# Patient Record
Sex: MALE | URBAN - METROPOLITAN AREA
[De-identification: names, ages, dates, MRNs, and addresses within clinical notes are randomized per-mention and may not be internally consistent; named-entity substitution may affect disease eponyms.]

---

## 2022-04-20 ENCOUNTER — APPOINTMENT (RX ONLY)
Dept: URBAN - METROPOLITAN AREA CLINIC 61 | Facility: CLINIC | Age: 82
Setting detail: DERMATOLOGY
End: 2022-04-20

## 2022-04-20 DIAGNOSIS — D69.2 OTHER NONTHROMBOCYTOPENIC PURPURA: ICD-10-CM

## 2022-04-20 DIAGNOSIS — D49.2 NEOPLASM OF UNSPECIFIED BEHAVIOR OF BONE, SOFT TISSUE, AND SKIN: ICD-10-CM | Status: WORSENING

## 2022-04-20 DIAGNOSIS — D22 MELANOCYTIC NEVI: ICD-10-CM

## 2022-04-20 PROBLEM — D22.5 MELANOCYTIC NEVI OF TRUNK: Status: ACTIVE | Noted: 2022-04-20

## 2022-04-20 PROCEDURE — ? FULL BODY SKIN EXAM - DECLINED

## 2022-04-20 PROCEDURE — 11102 TANGNTL BX SKIN SINGLE LES: CPT

## 2022-04-20 PROCEDURE — 99202 OFFICE O/P NEW SF 15 MIN: CPT | Mod: 25

## 2022-04-20 PROCEDURE — ? BIOPSY BY SHAVE METHOD

## 2022-04-20 PROCEDURE — ? COUNSELING

## 2022-04-20 PROCEDURE — ? ADDITIONAL NOTES

## 2022-04-20 ASSESSMENT — LOCATION SIMPLE DESCRIPTION DERM
LOCATION SIMPLE: LEFT FOREARM
LOCATION SIMPLE: RIGHT FOREARM
LOCATION SIMPLE: RIGHT UPPER BACK
LOCATION SIMPLE: LEFT HAND
LOCATION SIMPLE: CHEST

## 2022-04-20 ASSESSMENT — LOCATION DETAILED DESCRIPTION DERM
LOCATION DETAILED: RIGHT DISTAL DORSAL FOREARM
LOCATION DETAILED: LEFT MEDIAL SUPERIOR CHEST
LOCATION DETAILED: RIGHT SUPERIOR UPPER BACK
LOCATION DETAILED: LEFT ULNAR DORSAL HAND
LOCATION DETAILED: LEFT PROXIMAL DORSAL FOREARM

## 2022-04-20 ASSESSMENT — LOCATION ZONE DERM
LOCATION ZONE: HAND
LOCATION ZONE: ARM
LOCATION ZONE: TRUNK

## 2022-04-20 NOTE — PROCEDURE: BIOPSY BY SHAVE METHOD
Detail Level: Detailed
Depth Of Biopsy: dermis
Was A Bandage Applied: Yes
Size Of Lesion In Cm: 1
X Size Of Lesion In Cm: 0
Biopsy Type: H and E
Biopsy Method: Personna blade
Anesthesia Type: 1% lidocaine with epinephrine
Anesthesia Volume In Cc (Will Not Render If 0): 0.5
Hemostasis: Aluminum Chloride
Wound Care: Petrolatum
Dressing: pressure dressing
Destruction After The Procedure: No
Type Of Destruction Used: Curettage
Curettage Text: The wound bed was treated with curettage after the biopsy was performed.
Cryotherapy Text: The wound bed was treated with cryotherapy after the biopsy was performed.
Electrodesiccation Text: The wound bed was treated with electrodesiccation after the biopsy was performed.
Electrodesiccation And Curettage Text: The wound bed was treated with electrodesiccation and curettage after the biopsy was performed.
Silver Nitrate Text: The wound bed was treated with silver nitrate after the biopsy was performed.
Lab: 6
Lab Facility: [id number]
Consent: Written consent was obtained and risks were reviewed including but not limited to scarring, infection, bleeding, scabbing, incomplete removal, nerve damage and allergy to anesthesia.
Post-Care Instructions: I reviewed with the patient in detail post-care instructions. Patient is to keep the biopsy site dry overnight, and then apply vaseline twice daily until healed.
Notification Instructions: Patient will be notified of biopsy results. However, patient instructed to call the office if not contacted within 2 weeks.
Billing Type: Third-Party Bill
Information: Selecting Yes will display possible errors in your note based on the variables you have selected. This validation is only offered as a suggestion for you. PLEASE NOTE THAT THE VALIDATION TEXT WILL BE REMOVED WHEN YOU FINALIZE YOUR NOTE. IF YOU WANT TO FAX A PRELIMINARY NOTE YOU WILL NEED TO TOGGLE THIS TO 'NO' IF YOU DO NOT WANT IT IN YOUR FAXED NOTE.

## 2022-05-02 ENCOUNTER — APPOINTMENT (RX ONLY)
Dept: URBAN - METROPOLITAN AREA CLINIC 61 | Facility: CLINIC | Age: 82
Setting detail: DERMATOLOGY
End: 2022-05-02

## 2022-05-02 VITALS — HEART RATE: 61 BPM | DIASTOLIC BLOOD PRESSURE: 63 MMHG | SYSTOLIC BLOOD PRESSURE: 121 MMHG

## 2022-05-02 VITALS — SYSTOLIC BLOOD PRESSURE: 121 MMHG | HEART RATE: 61 BPM | DIASTOLIC BLOOD PRESSURE: 63 MMHG

## 2022-05-02 PROBLEM — C44.629 SQUAMOUS CELL CARCINOMA OF SKIN OF LEFT UPPER LIMB, INCLUDING SHOULDER: Status: ACTIVE | Noted: 2022-05-02

## 2022-05-02 PROCEDURE — ? ADDITIONAL NOTES

## 2022-05-02 PROCEDURE — ? MOHS SURGERY

## 2022-05-02 PROCEDURE — ? REPAIR NOTE

## 2022-05-02 PROCEDURE — 17311 MOHS 1 STAGE H/N/HF/G: CPT

## 2022-05-02 PROCEDURE — ? CONSULTATION FOR SURGICAL REPAIR

## 2022-05-02 PROCEDURE — 13132 CMPLX RPR F/C/C/M/N/AX/G/H/F: CPT

## 2022-05-02 NOTE — PROCEDURE: CONSULTATION FOR SURGICAL REPAIR
Size Of Defect: 1.5
Detail Level: Detailed
X Size Of Defect In Cm (Optional): 1.3
Referring Provider (Optional): Dr. Esquivel

## 2022-05-02 NOTE — PROCEDURE: MOHS SURGERY
Mohs Method Verbiage: An incision at a 90 degree angle following the standard Mohs approach was done and the specimen was harvested as a microscopic controlled layer. All histological findings, and if present, perineural invasion or presence of scar is noted on the Mohs Map.

## 2022-05-02 NOTE — PROCEDURE: MOHS SURGERY
Spoke to Dr Charles office, they will fax over new referral for Endocrinology. Also requesting any pertinent labs and Dexa scan to be faxed as well   Advancement Flap (Double) Text: Given the location of the defect and the proximity to free margins a double advancement flap was deemed most appropriate.  Using a sterile surgical marker, the appropriate advancement flaps were drawn incorporating the defect and placing the expected incisions within the relaxed skin tension lines where possible.    The area thus outlined was incised deep to adipose tissue with a #15c scalpel blade.  The skin margins were undermined to an appropriate distance in all directions utilizing iris scissors.

## 2022-05-09 ENCOUNTER — APPOINTMENT (RX ONLY)
Dept: URBAN - METROPOLITAN AREA CLINIC 61 | Facility: CLINIC | Age: 82
Setting detail: DERMATOLOGY
End: 2022-05-09

## 2022-05-09 DIAGNOSIS — Z48.817 ENCOUNTER FOR SURGICAL AFTERCARE FOLLOWING SURGERY ON THE SKIN AND SUBCUTANEOUS TISSUE: ICD-10-CM

## 2022-05-09 PROCEDURE — ? ADDITIONAL NOTES

## 2022-05-09 PROCEDURE — ? POST-OP WOUND CHECK

## 2022-05-09 PROCEDURE — ? TREATMENT REGIMEN

## 2022-05-09 PROCEDURE — 99024 POSTOP FOLLOW-UP VISIT: CPT

## 2022-05-09 ASSESSMENT — LOCATION DETAILED DESCRIPTION DERM: LOCATION DETAILED: LEFT ULNAR DORSAL HAND

## 2022-05-09 ASSESSMENT — LOCATION SIMPLE DESCRIPTION DERM: LOCATION SIMPLE: LEFT HAND

## 2022-05-09 ASSESSMENT — LOCATION ZONE DERM: LOCATION ZONE: HAND

## 2022-05-09 NOTE — PROCEDURE: POST-OP WOUND CHECK
Add 78949 Cpt? (Important Note: In 2017 The Use Of 14254 Is Being Tracked By Cms To Determine Future Global Period Reimbursement For Global Periods): yes
Detail Level: Detailed

## 2022-05-11 ENCOUNTER — APPOINTMENT (RX ONLY)
Dept: URBAN - METROPOLITAN AREA CLINIC 61 | Facility: CLINIC | Age: 82
Setting detail: DERMATOLOGY
End: 2022-05-11

## 2022-05-11 DIAGNOSIS — D22 MELANOCYTIC NEVI: ICD-10-CM

## 2022-05-11 DIAGNOSIS — Z85.828 PERSONAL HISTORY OF OTHER MALIGNANT NEOPLASM OF SKIN: ICD-10-CM | Status: RESOLVING

## 2022-05-11 DIAGNOSIS — L82.1 OTHER SEBORRHEIC KERATOSIS: ICD-10-CM

## 2022-05-11 DIAGNOSIS — L81.4 OTHER MELANIN HYPERPIGMENTATION: ICD-10-CM

## 2022-05-11 DIAGNOSIS — L57.0 ACTINIC KERATOSIS: ICD-10-CM

## 2022-05-11 DIAGNOSIS — Z71.89 OTHER SPECIFIED COUNSELING: ICD-10-CM

## 2022-05-11 PROBLEM — D22.5 MELANOCYTIC NEVI OF TRUNK: Status: ACTIVE | Noted: 2022-05-11

## 2022-05-11 PROCEDURE — ? ADDITIONAL NOTES

## 2022-05-11 PROCEDURE — ? SUNSCREEN TREATMENT REGIMEN

## 2022-05-11 PROCEDURE — ? LIQUID NITROGEN

## 2022-05-11 PROCEDURE — 99213 OFFICE O/P EST LOW 20 MIN: CPT | Mod: 24,25

## 2022-05-11 PROCEDURE — 17000 DESTRUCT PREMALG LESION: CPT | Mod: 79

## 2022-05-11 PROCEDURE — ? COUNSELING

## 2022-05-11 PROCEDURE — ? TREATMENT REGIMEN

## 2022-05-11 ASSESSMENT — LOCATION DETAILED DESCRIPTION DERM
LOCATION DETAILED: SUPERIOR THORACIC SPINE
LOCATION DETAILED: RIGHT PROXIMAL RADIAL DORSAL FOREARM
LOCATION DETAILED: LEFT PROXIMAL DORSAL FOREARM
LOCATION DETAILED: RIGHT SUPERIOR CRUS OF ANTIHELIX
LOCATION DETAILED: INFERIOR THORACIC SPINE
LOCATION DETAILED: LEFT ULNAR DORSAL HAND

## 2022-05-11 ASSESSMENT — LOCATION SIMPLE DESCRIPTION DERM
LOCATION SIMPLE: RIGHT EAR
LOCATION SIMPLE: UPPER BACK
LOCATION SIMPLE: RIGHT FOREARM
LOCATION SIMPLE: LEFT HAND
LOCATION SIMPLE: LEFT FOREARM

## 2022-05-11 ASSESSMENT — LOCATION ZONE DERM
LOCATION ZONE: HAND
LOCATION ZONE: ARM
LOCATION ZONE: EAR
LOCATION ZONE: TRUNK

## 2022-05-11 NOTE — PROCEDURE: LIQUID NITROGEN
Consent: The patient's verbal consent was obtained including but not limited to risks of crusting, scabbing, blistering, scarring, darker or lighter pigmentary change, recurrence, incomplete removal and infection.
Show Aperture Variable?: Yes
Render Post-Care Instructions In Note?: no
Detail Level: Detailed
Duration Of Freeze Thaw-Cycle (Seconds): 0
Post-Care Instructions: I reviewed with the patient in detail post-care instructions. Patient is to wear sunprotection, and avoid picking at any of the treated lesions. Pt may apply Vaseline to crusted or scabbing areas.

## 2022-05-23 ENCOUNTER — APPOINTMENT (RX ONLY)
Dept: URBAN - METROPOLITAN AREA CLINIC 61 | Facility: CLINIC | Age: 82
Setting detail: DERMATOLOGY
End: 2022-05-23

## 2022-05-23 DIAGNOSIS — Z48.817 ENCOUNTER FOR SURGICAL AFTERCARE FOLLOWING SURGERY ON THE SKIN AND SUBCUTANEOUS TISSUE: ICD-10-CM

## 2022-05-23 PROCEDURE — 99024 POSTOP FOLLOW-UP VISIT: CPT

## 2022-05-23 PROCEDURE — ? POST-OP WOUND CHECK

## 2022-05-23 PROCEDURE — ? TREATMENT REGIMEN

## 2022-05-23 ASSESSMENT — LOCATION DETAILED DESCRIPTION DERM: LOCATION DETAILED: LEFT ULNAR DORSAL HAND

## 2022-05-23 ASSESSMENT — LOCATION SIMPLE DESCRIPTION DERM: LOCATION SIMPLE: LEFT HAND

## 2022-05-23 ASSESSMENT — LOCATION ZONE DERM: LOCATION ZONE: HAND

## 2022-05-23 NOTE — PROCEDURE: POST-OP WOUND CHECK
Detail Level: Detailed
Add 07002 Cpt? (Important Note: In 2017 The Use Of 26668 Is Being Tracked By Cms To Determine Future Global Period Reimbursement For Global Periods): yes
Wound Evaluated By: RODRIGO Lassiter CMA
Additional Comments: Complex repair on the Left ulnar dorsal hand by Dr. Herzog.

## 2022-11-16 ENCOUNTER — APPOINTMENT (RX ONLY)
Dept: URBAN - METROPOLITAN AREA CLINIC 61 | Facility: CLINIC | Age: 82
Setting detail: DERMATOLOGY
End: 2022-11-16

## 2022-11-16 DIAGNOSIS — Z85.828 PERSONAL HISTORY OF OTHER MALIGNANT NEOPLASM OF SKIN: ICD-10-CM | Status: RESOLVED

## 2022-11-16 DIAGNOSIS — L81.4 OTHER MELANIN HYPERPIGMENTATION: ICD-10-CM

## 2022-11-16 DIAGNOSIS — D22 MELANOCYTIC NEVI: ICD-10-CM

## 2022-11-16 DIAGNOSIS — L82.1 OTHER SEBORRHEIC KERATOSIS: ICD-10-CM

## 2022-11-16 PROBLEM — D22.9 MELANOCYTIC NEVI, UNSPECIFIED: Status: ACTIVE | Noted: 2022-11-16

## 2022-11-16 PROCEDURE — ? SUNSCREEN TREATMENT REGIMEN

## 2022-11-16 PROCEDURE — 99213 OFFICE O/P EST LOW 20 MIN: CPT

## 2022-11-16 PROCEDURE — ? COUNSELING

## 2022-11-16 PROCEDURE — ? FULL BODY SKIN EXAM - DECLINED

## 2023-09-20 ENCOUNTER — APPOINTMENT (RX ONLY)
Dept: URBAN - METROPOLITAN AREA CLINIC 61 | Facility: CLINIC | Age: 83
Setting detail: DERMATOLOGY
End: 2023-09-20

## 2023-09-20 DIAGNOSIS — L82.1 OTHER SEBORRHEIC KERATOSIS: ICD-10-CM

## 2023-09-20 DIAGNOSIS — L81.4 OTHER MELANIN HYPERPIGMENTATION: ICD-10-CM

## 2023-09-20 DIAGNOSIS — L0390 CELLULITIS AND ABSCESS OF UNSPECIFIED SITES: ICD-10-CM

## 2023-09-20 DIAGNOSIS — L0391 CELLULITIS AND ABSCESS OF UNSPECIFIED SITES: ICD-10-CM

## 2023-09-20 DIAGNOSIS — L57.0 ACTINIC KERATOSIS: ICD-10-CM

## 2023-09-20 DIAGNOSIS — D22 MELANOCYTIC NEVI: ICD-10-CM

## 2023-09-20 PROBLEM — L02.212 CUTANEOUS ABSCESS OF BACK [ANY PART, EXCEPT BUTTOCK]: Status: ACTIVE | Noted: 2023-09-20

## 2023-09-20 PROBLEM — D22.5 MELANOCYTIC NEVI OF TRUNK: Status: ACTIVE | Noted: 2023-09-20

## 2023-09-20 PROCEDURE — ? LIQUID NITROGEN

## 2023-09-20 PROCEDURE — ? INCISION AND DRAINAGE

## 2023-09-20 PROCEDURE — 99213 OFFICE O/P EST LOW 20 MIN: CPT | Mod: 25

## 2023-09-20 PROCEDURE — ? COUNSELING

## 2023-09-20 PROCEDURE — 10060 I&D ABSCESS SIMPLE/SINGLE: CPT

## 2023-09-20 PROCEDURE — 17000 DESTRUCT PREMALG LESION: CPT

## 2023-09-20 PROCEDURE — ? FULL BODY SKIN EXAM - DECLINED

## 2023-09-20 PROCEDURE — 17003 DESTRUCT PREMALG LES 2-14: CPT

## 2023-09-20 ASSESSMENT — LOCATION ZONE DERM
LOCATION ZONE: SCALP
LOCATION ZONE: LIP
LOCATION ZONE: TRUNK

## 2023-09-20 ASSESSMENT — LOCATION DETAILED DESCRIPTION DERM
LOCATION DETAILED: LEFT MID-UPPER BACK
LOCATION DETAILED: LEFT SUPERIOR POSTAURICULAR SKIN
LOCATION DETAILED: LEFT SUPERIOR UPPER BACK
LOCATION DETAILED: LEFT UPPER CUTANEOUS LIP
LOCATION DETAILED: RIGHT SUPERIOR LATERAL MIDBACK
LOCATION DETAILED: RIGHT MID-UPPER BACK
LOCATION DETAILED: LEFT LATERAL ABDOMEN

## 2023-09-20 ASSESSMENT — LOCATION SIMPLE DESCRIPTION DERM
LOCATION SIMPLE: RIGHT UPPER BACK
LOCATION SIMPLE: SCALP
LOCATION SIMPLE: RIGHT LOWER BACK
LOCATION SIMPLE: ABDOMEN
LOCATION SIMPLE: LEFT LIP
LOCATION SIMPLE: LEFT UPPER BACK

## 2023-10-14 NOTE — PROCEDURE: MOHS SURGERY
PT evaluate and treat Home Suture Removal Text: Patient was provided instructions on removing sutures and will remove their sutures at home.  If they have any questions or difficulties they will call the office.

## 2024-05-03 NOTE — PROCEDURE: MOHS SURGERY
Bloqueador solar SPF 30 (broad spectrum) 15-30 minutos antes de salir afuera, puede poner cada 2 horas  Ropa y bong para proteccion del sol  Puede usar repelente de insectos con DEET  Debe bañarse antes de dormirse para quitar el repelente        Tylenol/Acetaminophen Dosing    Please dose every 4 hours as needed, do not give more than 5 doses in any 24 hour period  Children's Oral Suspension= 160 mg/5ml  Childrens Chewable =80 mg  Jr Strength Chewables= 160 mg                                                              Tylenol suspension   Childrens Chewable   Jr. Strength Chewable                                                                                                                                                                           12-17 lbs               2.5 ml  18-23 lbs               3.75 ml  24-35 lbs               5 ml                          2                              1      Ibuprofen/Advil/Motrin Dosing    Ibuprofen is dosed every 6-8 hours as needed  Never give more than 4 doses in a 24 hour period  Please note the difference in the strengths between infant and children's ibuprofen  Do not give ibuprofen to children under 6 months of age unless advised by your doctor    Infant Concentrated drops = 50 mg/1.25ml  Children's suspension =100 mg/5 ml  Children's chewable = 100mg                                   Infant concentrated      Childrens               Chewables                                            Drops                      Suspension                12-17 lbs                1.25 ml  18-23 lbs                1.875 ml      3.75 ml  24-35 lbs                2.5 ml                            5 ml                            1    Anesthesia Volume In Cc: 6

## 2024-09-18 ENCOUNTER — APPOINTMENT (RX ONLY)
Dept: URBAN - METROPOLITAN AREA CLINIC 61 | Facility: CLINIC | Age: 84
Setting detail: DERMATOLOGY
End: 2024-09-18

## 2024-09-18 DIAGNOSIS — Z85.828 PERSONAL HISTORY OF OTHER MALIGNANT NEOPLASM OF SKIN: ICD-10-CM

## 2024-09-18 DIAGNOSIS — D18.0 HEMANGIOMA: ICD-10-CM | Status: STABLE

## 2024-09-18 DIAGNOSIS — D22 MELANOCYTIC NEVI: ICD-10-CM

## 2024-09-18 DIAGNOSIS — B07.8 OTHER VIRAL WARTS: ICD-10-CM | Status: INADEQUATELY CONTROLLED

## 2024-09-18 DIAGNOSIS — L82.1 OTHER SEBORRHEIC KERATOSIS: ICD-10-CM

## 2024-09-18 DIAGNOSIS — L81.4 OTHER MELANIN HYPERPIGMENTATION: ICD-10-CM

## 2024-09-18 DIAGNOSIS — L57.0 ACTINIC KERATOSIS: ICD-10-CM | Status: INADEQUATELY CONTROLLED

## 2024-09-18 PROBLEM — D18.01 HEMANGIOMA OF SKIN AND SUBCUTANEOUS TISSUE: Status: ACTIVE | Noted: 2024-09-18

## 2024-09-18 PROBLEM — D48.5 NEOPLASM OF UNCERTAIN BEHAVIOR OF SKIN: Status: ACTIVE | Noted: 2024-09-18

## 2024-09-18 PROBLEM — D22.5 MELANOCYTIC NEVI OF TRUNK: Status: ACTIVE | Noted: 2024-09-18

## 2024-09-18 PROCEDURE — ? TREATMENT REGIMEN

## 2024-09-18 PROCEDURE — ? PHOTO-DOCUMENTATION

## 2024-09-18 PROCEDURE — ? BIOPSY BY SHAVE METHOD

## 2024-09-18 PROCEDURE — 99213 OFFICE O/P EST LOW 20 MIN: CPT | Mod: 25

## 2024-09-18 PROCEDURE — 17110 DESTRUCTION B9 LES UP TO 14: CPT

## 2024-09-18 PROCEDURE — 11102 TANGNTL BX SKIN SINGLE LES: CPT | Mod: 59

## 2024-09-18 PROCEDURE — ? BENIGN DESTRUCTION

## 2024-09-18 PROCEDURE — ? LIQUID NITROGEN

## 2024-09-18 PROCEDURE — ? COUNSELING

## 2024-09-18 PROCEDURE — 17000 DESTRUCT PREMALG LESION: CPT | Mod: 59

## 2024-09-18 PROCEDURE — ? DIAGNOSIS COMMENT

## 2024-09-18 ASSESSMENT — LOCATION DETAILED DESCRIPTION DERM
LOCATION DETAILED: RIGHT POSTERIOR SHOULDER
LOCATION DETAILED: EPIGASTRIC SKIN
LOCATION DETAILED: LEFT MEDIAL UPPER BACK
LOCATION DETAILED: LEFT POSTERIOR SHOULDER
LOCATION DETAILED: RIGHT ANTIHELIX
LOCATION DETAILED: LEFT INFERIOR UPPER BACK
LOCATION DETAILED: RIGHT ANTERIOR PROXIMAL THIGH
LOCATION DETAILED: LEFT MID-UPPER BACK
LOCATION DETAILED: RIGHT MID-UPPER BACK

## 2024-09-18 ASSESSMENT — LOCATION SIMPLE DESCRIPTION DERM
LOCATION SIMPLE: RIGHT EAR
LOCATION SIMPLE: RIGHT THIGH
LOCATION SIMPLE: ABDOMEN
LOCATION SIMPLE: LEFT SHOULDER
LOCATION SIMPLE: RIGHT SHOULDER
LOCATION SIMPLE: LEFT UPPER BACK
LOCATION SIMPLE: RIGHT UPPER BACK

## 2024-09-18 ASSESSMENT — LOCATION ZONE DERM
LOCATION ZONE: ARM
LOCATION ZONE: TRUNK
LOCATION ZONE: LEG
LOCATION ZONE: EAR

## 2024-09-18 NOTE — PROCEDURE: BIOPSY BY SHAVE METHOD
Detail Level: Detailed
Depth Of Biopsy: dermis
Was A Bandage Applied: Yes
Size Of Lesion In Cm: 0.5
X Size Of Lesion In Cm: 0
Anticipated Plan (Based On Presumed Biopsy Results): Mohs
Biopsy Type: H and E
Biopsy Method: double edge Personna blade
Anesthesia Type: 1% lidocaine with epinephrine
Hemostasis: Electrocautery and Aluminum Chloride
Wound Care: Petrolatum
Dressing: bandage
Destruction After The Procedure: No
Type Of Destruction Used: Curettage
Curettage Text: The wound bed was treated with curettage after the biopsy was performed.
Cryotherapy Text: The wound bed was treated with cryotherapy after the biopsy was performed.
Electrodesiccation Text: The wound bed was treated with electrodesiccation after the biopsy was performed.
Electrodesiccation And Curettage Text: The wound bed was treated with electrodesiccation and curettage after the biopsy was performed.
Silver Nitrate Text: The wound bed was treated with silver nitrate after the biopsy was performed.
Consent: Written consent was obtained and risks were reviewed including but not limited to scarring, infection, bleeding, scabbing, incomplete removal, nerve damage and allergy to anesthesia.
Post-Care Instructions: I reviewed with the patient in detail post-care instructions. Patient is to keep the biopsy site dry overnight, and then apply bacitracin twice daily until healed. Patient may apply hydrogen peroxide soaks to remove any crusting.
Notification Instructions: Patient will be notified of biopsy results. However, patient instructed to call the office if not contacted within 2 weeks.
Billing Type: Third-Party Bill
Information: Selecting Yes will display possible errors in your note based on the variables you have selected. This validation is only offered as a suggestion for you. PLEASE NOTE THAT THE VALIDATION TEXT WILL BE REMOVED WHEN YOU FINALIZE YOUR NOTE. IF YOU WANT TO FAX A PRELIMINARY NOTE YOU WILL NEED TO TOGGLE THIS TO 'NO' IF YOU DO NOT WANT IT IN YOUR FAXED NOTE.

## 2024-09-18 NOTE — PROCEDURE: BENIGN DESTRUCTION
Treatment Number (Will Not Render If 0): 0
Add 52 Modifier (Optional): no
Detail Level: Detailed
Post-Care Instructions: I reviewed with the patient in detail post-care instructions. Patient is to wear sunprotection, and avoid picking at any of the treated lesions. Pt may apply Vaseline to crusted or scabbing areas.
Consent: The patient's consent was obtained including but not limited to risks of crusting, scabbing, blistering, scarring, darker or lighter pigmentary change, recurrence, incomplete removal and infection.
Medical Necessity Information: It is in your best interest to select a reason for this procedure from the list below. All of these items fulfill various CMS LCD requirements except the new and changing color options.
Medical Necessity Clause: This procedure was medically necessary because the lesions that were treated were:

## 2025-05-06 NOTE — PROCEDURE: REPAIR NOTE
Preston Wallis presents to [facility].    Chief Complaint: Recent hospitalization    Subjective     History of Present Illness:  HPI  I am seeing Gómez today for recent hospitalization at [facility].  He is admitted on 4/22/2025 and discharged on 4/24/2025.  He was admitted due to nausea and vomiting with an elevated white count indicative of infection.  Workup was performed and given his GI symptoms it was suspected he had an intra-abdominal source of infection.  He was given IV vancomycin and Zosyn and is leukocytes improved.  Blood cultures were negative at discharge.  He was discharged home on ciprofloxacin and Flagyl for 7 more days.  While in the hospital he also had acute on chronic CHF exacerbation.  He was treated with Lasix 40 mg IV twice daily and was -6 L at the time of discharge.  He also presented with a wound to the left heel and coccyx.  He is under treatment with wound care Ermelinda Fitzgerald.  Wounds are being treated with Santyl and DAC and solution and the sacral wound is being managed with daily cleaning and application of Aquacel, which is then covered.  Home health is coming to see him.  He is nonambulatory and needs full assistance with standing and transfers.      Gómez has type 2 diabetes mellitus with diabetic peripheral angiopathy without gangrene, neurogenic bladder, and peripheral neuropathy in his on long-term current use of insulin Basaglar 15 units daily.  He is also on Farxiga 5 mg daily and Ozempic 1 mg weekly and he is tolerating medication well    He has chronic hypertension  he is on diltiazem 240 mg xr daily    He presents with benign prostate hypertrophy and self caths.  He is currently on Proscar and tamsulosin and sees urology.    He has chronic paroxysmal atrial fibrillation and he sees cardiology.  Current treatment includes Eliquis 5 mg twice daily and diltiazem 240 mg XR daily    He has chronic COPD and continues pulmonary toilet with Brovana  nebulizer, DuoNebs, budesonide nebs, MOIZ nebulizer, his chronic allergies are stable on Singulair    He has chronic hypercholesterolemia, current treatment is atorvastatin 20 mg daily and he tolerates med well.  He is also to follow a low cholesterol daily    He presents with chronic GERD which is stable on pantoprazole.     In addition he is on magnesium supplementation, vitamin D3, Rosalino, vitamin C, calcium, folic acid and zinc and iron    He has chronic anxiety and is currently stable on cymbalta and BuSpar 15 mg twice daily    He is on RADHA stool softeners for chronic constipation            Result Review   The following data was reviewed by Kimmy Riley MD on 05/06/2025.  Lab Results   Component Value Date    WBC 14.15 (H) 04/24/2025    HGB 8.4 (L) 04/24/2025    HCT 27.2 (L) 04/24/2025    MCV 89.5 04/24/2025     04/24/2025     Lab Results   Component Value Date    GLUCOSE 125 (H) 04/24/2025    BUN 22 (H) 04/24/2025    CREATININE 2.03 (H) 04/24/2025     04/24/2025    K 3.4 (L) 04/24/2025    CL 95 (L) 04/24/2025    CALCIUM 8.5 (L) 04/24/2025    PROTEINTOT 6.9 04/24/2025    ALBUMIN 2.5 (L) 04/24/2025    ALT 39 04/24/2025    AST 51 (H) 04/24/2025    ALKPHOS 224 (H) 04/24/2025    BILITOT 0.3 04/24/2025    GLOB 4.4 04/24/2025    AGRATIO 0.6 04/24/2025    BCR 10.8 04/24/2025    ANIONGAP 12.3 04/24/2025    EGFR 37.1 (L) 04/24/2025     Lab Results   Component Value Date    CHOL 116 01/25/2025    CHLPL 165 08/26/2020    TRIG 49 01/25/2025    HDL 54 01/25/2025    LDL 50 01/25/2025     Lab Results   Component Value Date    TSH 1.497 04/13/2024     Lab Results   Component Value Date    HGBA1C 7.0 (A) 03/14/2025     Lab Results   Component Value Date    PSA 0.206 07/18/2024    PSA 0.203 05/25/2023    PSA 0.725 03/17/2022     Lab Results   Component Value Date    Iron 31 (L) 01/31/2025    Iron Saturation 13 (L) 03/17/2020    Iron Saturation (TSAT) 17 (L) 01/31/2025      Lab Results   Component Value Date     ABYS50AT 41.4 07/18/2024               Assessment and Plan:   Assessment & Plan  Urinary tract infection without hematuria, site unspecified  Urine was positive for red blood cells but no signs of bacteria or white blood cells.  It is being sent for culture.  He is on Eliquis which puts him at higher risk for blood in urine  Orders:    Urinalysis With Culture If Indicated - Urine, Catheter    Urinalysis, Microscopic Only - Urine, Catheter    Leukocytosis, unspecified type  Will recheck a white blood cell count.  Recently treated for an internal abdominal infection with antibiotics.  He will complete antibiotics and check labs today       Type 2 diabetes mellitus with diabetic peripheral angiopathy without gangrene, with long-term current use of insulin  Overall stable and well-controlled on current treatment plan.  Continue current medications.  He needs his eye exam once a year.  Currently getting wound care for diabetic foot ulcer         Paroxysmal atrial fibrillation  He is in normal sinus rhythm today.  He is to follow-up with cardiology as directed and continue Cardizem and Eliquis as directed  Orders:    aspirin (Aspirin Low Dose) 81 MG EC tablet; Take 1 tablet by mouth Every Morning.    apixaban (Eliquis) 5 MG tablet tablet; Take 1 tablet by mouth Every 12 (Twelve) Hours.    dilTIAZem CD (CARDIZEM CD) 240 MG 24 hr capsule; Take 1 capsule by mouth Every Morning.    Obstructive sleep apnea  He needs to be on a CPAP machine and he did not tolerate can this is been removed from his home.  He understands the risk of not treating sleep apnea.  He is working on weight loss and Ozempic is helping.  He is unable to exercise.  Encourage healthy diet       Chronic obstructive pulmonary disease, unspecified COPD type   stable on current treatment plan.  No changes to current nebulizers         Benign prostatic hyperplasia with lower urinary tract symptoms, symptom details unspecified  Stable on tamsulosin and  Proscar.  No changes in current treatment plan.  Permanent catheter in place       Stage 3a chronic kidney disease  He is to avoid NSAIDs and push fluids 64 ounces a day    Orders:    Comprehensive metabolic panel; Future    Iron deficiency anemia secondary to inadequate dietary iron intake  Currently on iron supplementation.  Will check labs to see if his iron levels have improved  Orders:    CBC w AUTO Differential; Future    Iron Profile; Future    Vitamin D deficiency  Currently on vitamin D supplementation.  Will check labs to see if his vitamin D labs have improved  Orders:    Cholecalciferol (Vitamin D3) 50 MCG (2000 UT) tablet; Take 1 tablet by mouth Every Morning.    Gastroesophageal reflux disease, unspecified whether esophagitis present  GERD is stable on famotidine and Protonix and he tolerates meds well.  He is to avoid acidic and spicy foods in his diet       Hyperlipidemia, unspecified hyperlipidemia type  Current treatment includes low-cholesterol diet and atorvastatin 20 mg nightly, he tolerates med well.  Labs reviewed and he is under good control         Neurogenic bladder  Follow-up with urology as directed.  He has a bladder catheter in place       Essential hypertension  Borderline.  No changes in current meds or treatment plan.  He should minimize sodium in his diet.  Encouraged ongoing weight loss with the Ozempic         Mixed hyperlipidemia       Orders:    atorvastatin (LIPITOR) 20 MG tablet; Take 1 tablet by mouth every night at bedtime.    Anxiety    Orders:    busPIRone (BUSPAR) 15 MG tablet; Take 1 tablet by mouth 2 (Two) Times a Day.    Stage 3b chronic kidney disease (CKD)      Orders:    calcium citrate (CALCITRATE) 950 (200 Ca) MG tablet; Take 1 tablet by mouth every night at bedtime.    folic acid (FOLVITE) 1 MG tablet; Take 1 tablet by mouth every night at bedtime.    Magnesium Oxide -Mg Supplement 400 (240 Mg) MG tablet; Take 1 tablet by mouth every night at bedtime.    zinc  sulfate (ZINCATE) 220 (50 Zn) MG capsule; Take 1 capsule by mouth Every Morning.    Other constipation    Orders:    docusate sodium (COLACE) 100 MG capsule; Take 1 capsule by mouth 2 (Two) Times a Day.    Anxiety and depression      Orders:    DULoxetine (Cymbalta) 30 MG capsule; Take 1 capsule by mouth Daily.    Iron deficiency anemia, unspecified iron deficiency anemia type    Orders:    ferrous gluconate (FERGON) 324 MG tablet; Take 1 tablet by mouth Every Morning.    Multiple Vitamins-Iron (Multi-Vitamin/Iron) tablet; Take 1 tablet by mouth Every Morning.    Benign prostatic hyperplasia with urinary retention    Orders:    finasteride (PROSCAR) 5 MG tablet; Take 1 tablet by mouth every night at bedtime.    tamsulosin (FLOMAX) 0.4 MG capsule 24 hr capsule; Take 1 capsule by mouth every night at bedtime.    COPD exacerbation      Orders:    montelukast (SINGULAIR) 10 MG tablet; Take 1 tablet by mouth Every Night.    Gastroesophageal reflux disease without esophagitis    Orders:    pantoprazole (PROTONIX) 40 MG EC tablet; Take 1 tablet by mouth Every Morning.    Uncontrolled type 2 diabetes mellitus with hyperglycemia      Orders:    Semaglutide, 1 MG/DOSE, (Ozempic, 1 MG/DOSE,) 4 MG/3ML solution pen-injector; Inject 1 mg under the skin into the appropriate area as directed 1 (One) Time Per Week.    Ulcer of left foot, limited to breakdown of skin    Orders:    vitamin C (ASCORBIC ACID) 500 MG tablet; Take 1 tablet by mouth 2 (Two) Times a Day.    Type 2 diabetes mellitus with hyperglycemia, with long-term current use of insulin      Orders:    dapagliflozin (Farxiga) 5 MG tablet tablet; Take 1 tablet by mouth Every Morning.               Objective     Medications:  Current Outpatient Medications   Medication Instructions    apixaban (ELIQUIS) 5 mg, Oral, Every 12 Hours Scheduled    arformoterol (BROVANA) 15 mcg, Nebulization, 2 Times Daily - RT    aspirin (ASPIRIN LOW DOSE) 81 mg, Oral, Every Morning     atorvastatin (LIPITOR) 20 mg, Oral, Every Night at Bedtime    BASAGLAR KWIKPEN 15 Units, Subcutaneous, Daily    budesonide (PULMICORT) 0.5 mg, Nebulization, 2 Times Daily    busPIRone (BUSPAR) 15 mg, Oral, 2 Times Daily    calcium citrate (CALCITRATE) 950 mg, Oral, Every Night at Bedtime    collagenase (Santyl) 250 UNIT/GM ointment 1 Application, Topical, Daily    Continuous Glucose Sensor (FreeStyle Bethany 3 Plus Sensor) USE 2 EVERY 30 DAYS. APPLY AS directed EVERY 15 DAYS    dapagliflozin (FARXIGA) 5 mg, Oral, Every Morning    dilTIAZem CD (CARDIZEM CD) 240 mg, Oral, Every Morning    docusate sodium (COLACE) 100 mg, Oral, 2 Times Daily    DULoxetine (CYMBALTA) 30 mg, Oral, Daily    ferrous gluconate (FERGON) 324 mg, Oral, Every Morning    finasteride (PROSCAR) 5 mg, Oral, Every Night at Bedtime    folic acid (FOLVITE) 1,000 mcg, Oral, Every Night at Bedtime    Insulin Lispro (1 Unit Dial) (HUMALOG) 5 Units, Subcutaneous, 3 Times Daily Before Meals    ipratropium-albuterol (DUO-NEB) 0.5-2.5 mg/3 ml nebulizer 3 mL, Nebulization, Every 4 Hours PRN    Magnesium Oxide -Mg Supplement 400 mg, Oral, Every Night at Bedtime    montelukast (SINGULAIR) 10 mg, Oral, Nightly    Multiple Vitamins-Iron (Multi-Vitamin/Iron) tablet 1 tablet, Oral, Every Morning    Nutritional Supplements (Rosalino Nutrivigor) pack 1 packet, Oral, 2 Times Daily    ondansetron ODT (ZOFRAN-ODT) 4 mg, Translingual, Every 8 Hours PRN    Ozempic (1 MG/DOSE) 1 mg, Subcutaneous, Weekly    pantoprazole (PROTONIX) 40 mg, Oral, Every Early Morning    Povidone-Iodine (Betadine) 5 % external solution 5% 1 Application, 2 Times Daily    silver sulfadiazine (SILVADENE, SSD) 1 % cream Apply  topically to the appropriate area as directed.    sodium hypochlorite (DAKIN'S 1/4 STRENGTH) 0.125 % solution topical solution 0.125% 10 mL, Topical, 2 Times Daily    tamsulosin (FLOMAX) 0.4 mg, Oral, Every Night at Bedtime    tobramycin PF (MOIZ) 300 MG/5ML nebulizer solution  "nebulize 1 vial BY MOUTH TWICE DAILY FOR 28 DAYS ON AND 28 DAYS OFF    vitamin C (ASCORBIC ACID) 500 mg, Oral, 2 Times Daily    Vitamin D3 50 mcg, Oral, Every Morning    zinc sulfate (ZINCATE) 220 mg, Oral, Every Morning        Vital Signs:   /67 (BP Location: Left arm, Patient Position: Sitting, Cuff Size: Adult)   Pulse 106   Ht 175.3 cm (69.02\")   SpO2 98%   BMI 34.01 kg/m²           BP Readings from Last 3 Encounters:   05/06/25 145/67   04/28/25 148/77   04/24/25 144/64      Wt Readings from Last 3 Encounters:   04/24/25 104 kg (230 lb 6.1 oz)   04/17/25 103 kg (227 lb)   03/31/25 103 kg (227 lb)        Physical Exam:  Physical Exam  Vitals and nursing note reviewed.   Constitutional:       General: He is not in acute distress.     Appearance: Normal appearance. He is not ill-appearing, toxic-appearing or diaphoretic.   HENT:      Head: Normocephalic and atraumatic.      Right Ear: Tympanic membrane, ear canal and external ear normal.      Left Ear: Tympanic membrane, ear canal and external ear normal.      Nose: No congestion or rhinorrhea.      Mouth/Throat:      Mouth: Mucous membranes are moist.      Pharynx: Oropharynx is clear. No oropharyngeal exudate or posterior oropharyngeal erythema.   Eyes:      Extraocular Movements: Extraocular movements intact.      Conjunctiva/sclera: Conjunctivae normal.      Pupils: Pupils are equal, round, and reactive to light.   Cardiovascular:      Rate and Rhythm: Normal rate and regular rhythm.      Heart sounds: Normal heart sounds.   Pulmonary:      Effort: Pulmonary effort is normal.      Breath sounds: Normal breath sounds. No wheezing, rhonchi or rales.   Abdominal:      General: Abdomen is flat.      Palpations: Abdomen is soft. There is no mass.      Tenderness: There is no abdominal tenderness.      Hernia: No hernia is present.   Musculoskeletal:      Cervical back: Neck supple. No rigidity.      Right lower leg: No edema.      Left lower leg: No " edema.   Lymphadenopathy:      Cervical: No cervical adenopathy.   Skin:     General: Skin is warm and dry.   Neurological:      General: No focal deficit present.      Mental Status: He is alert and oriented to person, place, and time. Mental status is at baseline.   Psychiatric:         Mood and Affect: Mood normal.         Behavior: Behavior normal.         Thought Content: Thought content normal.         Judgment: Judgment normal.           Review of Systems:  Review of Systems   Constitutional:  Negative for chills and fever.   HENT:  Negative for congestion, ear discharge and sore throat.    Respiratory:  Positive for wheezing. Negative for cough and shortness of breath.    Cardiovascular:  Negative for chest pain, palpitations and leg swelling.   Gastrointestinal:  Negative for abdominal pain, constipation, diarrhea, nausea, vomiting and GERD.   Genitourinary:  Negative for flank pain.   Skin:  Positive for skin lesions.   Neurological:  Negative for dizziness and headache.   Psychiatric/Behavioral:  Negative for sleep disturbance and suicidal ideas.               Follow Up   Return in about 3 months (around 8/6/2025) for Recheck.    Part of this note may be an electronic transcription/translation of spoken language to printed   text using the Dragon Dictation System.              Health Maintenance   Topic Date Due    URINE MICROALBUMIN-CREATININE RATIO (uACR)  07/27/2022    DIABETIC EYE EXAM  11/01/2024    INFLUENZA VACCINE  07/01/2025    ANNUAL PHYSICAL  07/11/2025    HEMOGLOBIN A1C  09/14/2025    DIABETIC FOOT EXAM  10/22/2025    LIPID PANEL  01/25/2026    COLORECTAL CANCER SCREENING  09/08/2027    TDAP/TD VACCINES (2 - Td or Tdap) 09/18/2028    HEPATITIS C SCREENING  Completed    COVID-19 Vaccine  Completed    Pneumococcal Vaccine 50+  Completed    ZOSTER VACCINE  Completed    Hepatitis B  Discontinued          Medical History:  Medications Discontinued During This Encounter   Medication Reason     tiotropium (SPIRIVA) 18 MCG per inhalation capsule *Therapy completed    metFORMIN ER (GLUCOPHAGE-XR) 500 MG 24 hr tablet *Therapy completed    ferrous sulfate 324 (65 Fe) MG tablet delayed-release EC tablet Duplicate order    omeprazole (priLOSEC) 40 MG capsule *Therapy completed    hydrALAZINE (APRESOLINE) 50 MG tablet *Therapy completed    potassium chloride (KLOR-CON M10) 10 MEQ CR tablet *Therapy completed    gabapentin (NEURONTIN) 300 MG capsule *Therapy completed    metroNIDAZOLE (Flagyl) 500 MG tablet *Therapy completed    ciprofloxacin (Cipro) 500 MG tablet *Therapy completed    famotidine (PEPCID) 40 MG tablet *Therapy completed    Fluticasone-Salmeterol (ADVAIR/WIXELA) 500-50 MCG/ACT DISKUS *Therapy completed    Semaglutide, 1 MG/DOSE, (Ozempic, 1 MG/DOSE,) 4 MG/3ML solution pen-injector Reorder    DULoxetine (Cymbalta) 30 MG capsule Reorder    busPIRone (BUSPAR) 15 MG tablet Reorder    finasteride (PROSCAR) 5 MG tablet Reorder    vitamin C (ASCORBIC ACID) 500 MG tablet Reorder    docusate sodium (COLACE) 100 MG capsule Reorder    Aspirin Low Dose 81 MG EC tablet Reorder    folic acid (FOLVITE) 1 MG tablet Reorder    Cholecalciferol (Vitamin D3) 50 MCG (2000 UT) tablet Reorder    Magnesium Oxide -Mg Supplement 400 (240 Mg) MG tablet Reorder    calcium citrate (CALCITRATE) 950 (200 Ca) MG tablet Reorder    Multiple Vitamins-Iron (Multi-Vitamin/Iron) tablet Reorder    atorvastatin (LIPITOR) 20 MG tablet Reorder    tamsulosin (FLOMAX) 0.4 MG capsule 24 hr capsule Reorder    Eliquis 5 MG tablet tablet Reorder    montelukast (SINGULAIR) 10 MG tablet Reorder    dilTIAZem CD (CARDIZEM CD) 240 MG 24 hr capsule Reorder    zinc sulfate (ZINCATE) 220 (50 Zn) MG capsule Reorder    pantoprazole (PROTONIX) 40 MG EC tablet Reorder    dapagliflozin (Farxiga) 5 MG tablet tablet Reorder    ferrous gluconate (FERGON) 324 MG tablet Reorder      Past Medical History:    1. Incision and drainage of posterior perianal abscess  "2. Sharp excisional debridement through skin and subcutaneous tissue in the posterior perianal area, 9 x 6 x 1 cm    Age-related cognitive decline    Allergic contact dermatitis    Allergies    Anemia    Asthma    Bedbound    11/2023 \"MY LEG MUSCLES STOPPED WORKING\"    Bronchiectasis with acute lower respiratory infection    Charcot foot due to diabetes mellitus    Chronic diastolic (congestive) heart failure    Chronic kidney disease    Chronic respiratory failure with hypoxia    Closed supracondylar fracture of femur    COPD (chronic obstructive pulmonary disease)    Deep vein thrombosis (DVT) of lower extremity associated with air travel    Dependence on supplemental oxygen    Eczema    Elevated cholesterol    Erectile dysfunction    due to organic reasons    Essential (primary) hypertension    Fracture    closed fracture of other tarsal and metatarsal bones    Fracture of proximal humerus    GERD without esophagitis    High risk medication use    Hypercholesteremia    Hypomagnesemia    Infected stasis ulcer of left lower extremity    Insomnia    Low back pain    Major depressive disorder    Morbid (severe) obesity due to excess calories    MRSA pneumonia    Muscle weakness    Non-pressure chronic ulcer of other part of unspecified foot with bone involvement without evidence of necrosis    Obstructive sleep apnea (adult) (pediatric)    On home O2    REPORTS WEARING 2L/NC AAT    Other forms of dyspnea    Other long term (current) drug therapy    Other specified noninfective gastroenteritis and colitis    Other spondylosis, lumbar region    Pain in both knees    Paroxysmal atrial fibrillation    Peripheral neuropathy    attributed to type 2 diabetes    Pneumonia, unspecified organism    Polyneuropathy    Rash and other nonspecific skin eruption    Self-catheterizes urinary bladder    EVERY 4 HOURS    Smoking    \"SOMETIMES\"    Syncope and collapse    Tachycardia    Tinnitus    Type 1 diabetes mellitus with diabetic " chronic kidney disease    Type 2 diabetes mellitus    Unspecified fall, initial encounter    Urinary retention     Past Surgical History:    BRONCHOSCOPY    Procedure: BRONCHOSCOPY: BAL: insertion of lighted instrument to view inside the lung;  Surgeon: Walter Nicole DO;  Location: [facility] MAIN OR;  Service: Pulmonary;  Laterality: N/A;    BRONCHOSCOPY    Procedure: BRONCHOSCOPY WITH BRONCHOALVEOLAR LAVAGE, POSSIBLE BIOPSY, BRUSHING, WASHING, AIRWAY INSPECTION: insertion of lighted instrument to view inside the lung;  Surgeon: Chadd Swan MD;  Location: [facility] MAIN OR;  Service: Pulmonary;  Laterality: N/A;    CARDIAC CATHETERIZATION    Procedure: Carbon dioxide aortogram with left leg angiogram, possible angioplasty or stenting;  Surgeon: Moshe Willson MD;  Location: [facility] CATH INVASIVE LOCATION;  Service: Vascular;  Laterality: Left;    CHOLECYSTECTOMY    COLOSTOMY    Procedure: COLOSTOMY LAPAROSCOPIC; plain text: creation of colostomy using small incisions;  Surgeon: Emerson Canada MD;  [place] [facility] [place] [facility]  Service: General;  Laterality: N/A;    CYSTOSCOPY    ENDOSCOPY    Procedure: ESOPHAGOGASTRODUODENOSCOPY WITH BIOPSIES;  Surgeon: Rafael Hernandez MD;  Location: [facility] ENDOSCOPY;  Service: Gastroenterology;  Laterality: N/A;  HIATAL HERNIA, REFLUX ESOPHAGITIS    FEMUR SURGERY    Shravan placed    INCISION AND DRAINAGE ABSCESS    Procedure: INCISION AND DRAINAGE ABSCESS; plain text: incision and drain pus from buttocks wound;  Surgeon: Emerson Canada MD;  [place] [facility] [place] [facility]  Service: General;  Laterality: N/A;    KNEE SURGERY    OTHER SURGICAL HISTORY    venous port, REMOVED    PORTACATH PLACEMENT    TIBIAL PLATEAU OPEN REDUCTION INTERNAL FIXATION    Procedure: TIBIAL PLATEAU OPEN REDUCTION INTERNAL FIXATION;  Surgeon: Hugo Kline MD;  Location: [facility];  Service: Orthopedics;  Laterality: Left;    TONSILLECTOMY AND ADENOIDECTOMY      Family History    Problem Relation Age of Onset    Coronary artery disease Mother     Hypertension Mother     Diabetes type II Mother     Asthma Father     Diabetes type II Sister     Cancer Sister     Malig Hyperthermia Neg Hx      Social History     Tobacco Use    Smoking status: Former     Current packs/day: 0.00     Average packs/day: 1 pack/day for 12.0 years (12.0 ttl pk-yrs)     Types: Cigarettes     Start date:      Quit date:      Years since quittin.3     Passive exposure: Past    Smokeless tobacco: Never   Substance Use Topics    Alcohol use: Not Currently       Health Maintenance Due   Topic Date Due    URINE MICROALBUMIN-CREATININE RATIO (uACR)  2022    DIABETIC EYE EXAM  2024        Immunization History   Administered Date(s) Administered    COVID-19 (PFIZER) 12YRS+ (COMIRNATY) 10/22/2024    Flu Vaccine Quad PF >36MO 10/18/2016, 10/16/2017, 2019    Flu Vaccine Split Quad 2019    Fluzone  >6mos 2013    Fluzone (or Fluarix & Flulaval for VFC) >6mos 2013, 10/18/2016, 10/16/2017, 2019, 10/19/2022, 2023    Fluzone High-Dose 65+YRS 10/22/2024    Influenza Injectable Mdck Pf Quad 10/19/2022    Influenza, Unspecified 10/18/2016, 10/16/2017, 2019, 2020    PEDS-Pneumococcal Conjugate (PCV7) 2023    Pneumococcal Conjugate 20-Valent (PCV20) 2023    Pneumococcal Polysaccharide (PPSV23) 1997    Shingrix 2024, 10/22/2024    Tdap 2018    influenza Split 2019       Allergies   Allergen Reactions    Benadryl [Diphenhydramine] Itching    Proventil [Albuterol] Other (See Comments)     Mouth sores         Double O-Z Plasty Text: The defect edges were debeveled with a #15 scalpel blade.  Given the location of the defect, shape of the defect and the proximity to free margins a Double O-Z plasty (double transposition flap) was deemed most appropriate.  Using a sterile surgical marker, the appropriate transposition flaps were drawn incorporating the defect and placing the expected incisions within the relaxed skin tension lines where possible. The area thus outlined was incised deep to adipose tissue with a #15 scalpel blade.  The skin margins were undermined to an appropriate distance in all directions utilizing iris scissors.  Hemostasis was achieved with electrocautery.  The flaps were then transposed into place, one clockwise and the other counterclockwise, [id number] [phone] with [id number] subcutaneous [id number]